# Patient Record
Sex: MALE | Race: WHITE | Employment: FULL TIME | URBAN - METROPOLITAN AREA
[De-identification: names, ages, dates, MRNs, and addresses within clinical notes are randomized per-mention and may not be internally consistent; named-entity substitution may affect disease eponyms.]

---

## 2024-10-23 ENCOUNTER — OFFICE VISIT (OUTPATIENT)
Age: 54
End: 2024-10-23

## 2024-10-23 DIAGNOSIS — Z01.89 ROUTINE LAB DRAW: Primary | ICD-10-CM

## 2024-10-23 PROBLEM — R63.5 ABNORMAL WEIGHT GAIN: Status: ACTIVE | Noted: 2017-11-03

## 2024-10-23 PROBLEM — Z72.3 LACK OF PHYSICAL ACTIVITY: Status: ACTIVE | Noted: 2024-10-23

## 2024-10-23 RX ORDER — LISINOPRIL AND HYDROCHLOROTHIAZIDE 12.5; 2 MG/1; MG/1
1 TABLET ORAL DAILY
COMMUNITY
Start: 2024-10-23 | End: 2025-10-23

## 2024-10-23 RX ORDER — PHENTERMINE HYDROCHLORIDE 37.5 MG/1
37.5 TABLET ORAL DAILY
COMMUNITY
Start: 2024-10-23

## 2024-10-23 RX ORDER — LEVOTHYROXINE SODIUM 100 UG/1
100 TABLET ORAL DAILY
COMMUNITY
Start: 2024-10-23

## 2024-10-23 ASSESSMENT — ENCOUNTER SYMPTOMS: COUGH: 0

## 2024-10-23 NOTE — PROGRESS NOTES
Physical Exam  Constitutional:       General: He is not in acute distress.     Appearance: Normal appearance. He is obese. He is not ill-appearing or toxic-appearing.   HENT:      Head: Normocephalic and atraumatic.   Pulmonary:      Breath sounds: Normal breath sounds.   Skin:     General: Skin is warm and dry.   Neurological:      Mental Status: He is alert and oriented to person, place, and time.   Psychiatric:         Mood and Affect: Mood normal.         Behavior: Behavior normal.         ASSESSMENT and PLAN    Holden was seen today for labs only.    Diagnoses and all orders for this visit:    Routine lab draw      Successful venipuncture of the left AC x 1 attempt. Pt tolerated procedure well and site WNL after procedure complete.   Will send Hgb A1c and TSH with reflex to pos. To lab di. Discussed with pt that he may receive a bill for the bloodwork from [employer].   Pt to RTC on Friday 10.25.24 or Monday 10.28.24 for lab review.   Pt to notify PCP that labs completed in this office.     *Side effects, adverse effects, risks versus benefits associated with medications prescribed/recommended were discussed with the patient. Patient verbalized understanding of information discussed today and pt agrees to the plan of care. All questions answered.    *Patient was encouraged to return to the clinic and/or PCP. Or seek emergent care if worsening signs and symptoms warrant immediate evaluation including, but not limited to HA, blurred vision, speech disturbance, difficulty with ambulation/gait, numbness, tingling, weakness, syncope, chest pain, or shortness of breath.    I have reviewed the patient's medication list, past medical, family, social, and surgical history in detail and updated the patient record appropriately.    Follow-up and Dispositions    Return in about 2 days (around 10/25/2024) for Review of labs.         Lore Daley, APRN - CNP

## 2024-10-24 ENCOUNTER — OFFICE VISIT (OUTPATIENT)
Age: 54
End: 2024-10-24

## 2024-10-24 DIAGNOSIS — Z71.9 HEALTH EDUCATION/COUNSELING: Primary | ICD-10-CM

## 2024-10-24 DIAGNOSIS — R73.09 ELEVATED HEMOGLOBIN A1C: ICD-10-CM

## 2024-10-24 LAB
HBA1C MFR BLD: 6.3 % (ref 4.8–5.6)
TSH SERPL DL<=0.005 MIU/L-ACNC: 2.71 UIU/ML (ref 0.45–4.5)

## 2024-10-24 ASSESSMENT — ENCOUNTER SYMPTOMS
COUGH: 0
BLURRED VISION: 0

## 2024-10-24 NOTE — PROGRESS NOTES
syncope, chest pain, or shortness of breath.    I have reviewed the patient's medication list, past medical, family, social, and surgical history in detail and updated the patient record appropriately.    Follow-up and Dispositions    Return if symptoms worsen or fail to improve.         Lore Daley, APRN - CNP

## 2024-12-13 ENCOUNTER — OFFICE VISIT (OUTPATIENT)
Age: 54
End: 2024-12-13

## 2024-12-13 VITALS
SYSTOLIC BLOOD PRESSURE: 122 MMHG | HEART RATE: 72 BPM | RESPIRATION RATE: 18 BRPM | OXYGEN SATURATION: 99 % | DIASTOLIC BLOOD PRESSURE: 70 MMHG

## 2024-12-13 DIAGNOSIS — Z01.30 BLOOD PRESSURE CHECK: Primary | ICD-10-CM

## 2024-12-13 PROBLEM — R73.01 IFG (IMPAIRED FASTING GLUCOSE): Status: ACTIVE | Noted: 2024-10-28

## 2024-12-13 RX ORDER — LISINOPRIL 20 MG/1
20 TABLET ORAL DAILY
COMMUNITY
Start: 2024-12-04 | End: 2025-12-04

## 2024-12-13 ASSESSMENT — ENCOUNTER SYMPTOMS: BLURRED VISION: 0

## 2024-12-13 NOTE — PROGRESS NOTES
-- DO NOT REPLY / DO NOT REPLY ALL --  -- This inbox is not monitored. If this was sent to the wrong provider or department, reroute message to P [facility] Reroute pool. --  -- Message is from [facility] ([facility]) --    Patient has called to Cancel or Reschedule their upcoming appointment. Please contact patient as needed.     Existing appointment date / time: 09/2025    Provider: Dr. Wes Calles  Cancellation requested, [employer] ([employer]) instructs [facility] not to cancel.    Caller Information       Contact Date/Time Type Contact Phone/Fax    09/23/2024 09:27 AM CDT Phone (Incoming) Ana Christopher (Self) [phone] (M)            Patient requests callback: No   Callback phone number: [phone]    Can a detailed message be left? Yes - Voicemail     Patient has been advised the message will be addressed within 2-3 business days.           PROGRESS NOTE  Pt seen in the [facility]  SUBJECTIVE:   Holden Neumann is a 54 y.o. male seen for a visit regarding blood pressure check     Comes to the wellness clinic for follow up of progress with weight loss and blood pressure check. He tells me that since October 25, 2024 he has lost 30 lbs by diet, exercise and Adipex. HE has been walking 2.5 - 3 miles 5 days a week and following a DASH diet. He tells me he feels so much better. He has more energy, less aches and pains. Late November/early December, he noticed that he was light headed with position changes and feeling dizzy at times. He saw his PCP on December 4 at which time his blood pressure medication was changed from lisinopril / hctz 20 / 12.5mg tabs daily to lisinopril 20mg PO daily. There was a delay at the pharmacy and he was unable to get the new medication for 2 days. During that time his home blood pressure readings were 98/62 and 116/70. He decided to hold off on starting the new medication as he suspected the dizziness possibly related to low blood pressure.       History provided by:  Patient   used: No        Review of Systems   Constitutional: Negative for chills, fever and malaise/fatigue.   Eyes:  Negative for blurred vision.   Cardiovascular:  Negative for chest pain.   Neurological:  Negative for dizziness, headaches and light-headedness.       Current Outpatient Medications   Medication Sig Dispense Refill    lisinopril (PRINIVIL;ZESTRIL) 20 MG tablet Take 1 tablet by mouth daily      levothyroxine (SYNTHROID) 100 MCG tablet Take 1 tablet by mouth daily      phentermine (ADIPEX-P) 37.5 MG tablet Take 1 tablet by mouth daily. Max Daily Amount: 37.5 mg       No current facility-administered medications for this visit.     No Known Allergies  Patient Active Problem List   Diagnosis    Abnormal weight gain    Essential hypertension, benign    Hypothyroidism due to acquired atrophy of thyroid    Lack of

## 2025-01-17 ENCOUNTER — OFFICE VISIT (OUTPATIENT)
Age: 55
End: 2025-01-17

## 2025-01-17 VITALS — SYSTOLIC BLOOD PRESSURE: 120 MMHG | DIASTOLIC BLOOD PRESSURE: 76 MMHG | OXYGEN SATURATION: 99 % | HEART RATE: 72 BPM

## 2025-01-17 DIAGNOSIS — Z01.30 BLOOD PRESSURE CHECK: Primary | ICD-10-CM

## 2025-01-21 ASSESSMENT — ENCOUNTER SYMPTOMS: BLURRED VISION: 0

## 2025-01-21 NOTE — PROGRESS NOTES
PROGRESS NOTE  Pt seen in the [facility]  SUBJECTIVE:   Holden Neumann is a 54 y.o. male seen for a visit regarding blood pressure check     Comes to the wellness clinic for a blood pressure check. He tells me he feels great. He is walking 3 miles a day, has changed his diet to the DASH diet, and has lost 38 lbs.   He has not taken his blood pressure medication in about a month.   His home readings are consistent and range between 116 - 126 / 70 - 80. He did have one reading of 130/80 but after he rechecked it, it came back down to 120/76     Since stopping all blood pressure medications, he has not had any additional dizzy spells      History provided by:  Patient   used: No        Review of Systems   Constitutional: Negative for chills, diaphoresis, fever and malaise/fatigue.   Eyes:  Negative for blurred vision.   Cardiovascular:  Negative for chest pain, dyspnea on exertion and palpitations.   Neurological:  Negative for dizziness and headaches.       Current Outpatient Medications   Medication Sig Dispense Refill    lisinopril (PRINIVIL;ZESTRIL) 20 MG tablet Take 1 tablet by mouth daily      levothyroxine (SYNTHROID) 100 MCG tablet Take 1 tablet by mouth daily      phentermine (ADIPEX-P) 37.5 MG tablet Take 1 tablet by mouth daily. Max Daily Amount: 37.5 mg       No current facility-administered medications for this visit.     No Known Allergies  Patient Active Problem List   Diagnosis    Abnormal weight gain    Essential hypertension, benign    Hypothyroidism due to acquired atrophy of thyroid    Lack of physical activity    IFG (impaired fasting glucose)     No past medical history on file.  No past surgical history on file.  No family history on file.  Social History     Tobacco Use    Smoking status: Not on file    Smokeless tobacco: Not on file   Substance Use Topics    Alcohol use: Not on file       Past Medical History, Past Surgical History, Family history, Social